# Patient Record
Sex: FEMALE
[De-identification: names, ages, dates, MRNs, and addresses within clinical notes are randomized per-mention and may not be internally consistent; named-entity substitution may affect disease eponyms.]

---

## 2017-01-12 ENCOUNTER — RESULT REVIEW (OUTPATIENT)
Age: 37
End: 2017-01-12

## 2017-01-12 DIAGNOSIS — R93.8 ABNORMAL FINDINGS ON DIAGNOSTIC IMAGING OF OTHER SPECIFIED BODY STRUCTURES: ICD-10-CM

## 2017-04-03 ENCOUNTER — OTHER (OUTPATIENT)
Age: 37
End: 2017-04-03

## 2017-05-09 ENCOUNTER — APPOINTMENT (OUTPATIENT)
Dept: OBGYN | Facility: CLINIC | Age: 37
End: 2017-05-09

## 2017-05-09 VITALS
BODY MASS INDEX: 22.08 KG/M2 | WEIGHT: 120 LBS | HEIGHT: 62 IN | SYSTOLIC BLOOD PRESSURE: 104 MMHG | DIASTOLIC BLOOD PRESSURE: 68 MMHG

## 2017-05-10 LAB
BILIRUB UR QL STRIP: NORMAL
COLLECTION METHOD: NORMAL
GLUCOSE UR-MCNC: NORMAL
HCG UR QL: 0.2 EU/DL
HGB UR QL STRIP.AUTO: NORMAL
KETONES UR-MCNC: NORMAL
LEUKOCYTE ESTERASE UR QL STRIP: NORMAL
NITRITE UR QL STRIP: NORMAL
PH UR STRIP: 6
PROT UR STRIP-MCNC: NORMAL
SP GR UR STRIP: <=1.005

## 2017-05-11 DIAGNOSIS — Q51.9 CONGENITAL MALFORMATION OF UTERUS AND CERVIX, UNSPECIFIED: ICD-10-CM

## 2017-05-12 ENCOUNTER — RESULT REVIEW (OUTPATIENT)
Age: 37
End: 2017-05-12

## 2017-08-03 ENCOUNTER — APPOINTMENT (OUTPATIENT)
Dept: HUMAN REPRODUCTION | Facility: CLINIC | Age: 37
End: 2017-08-03
Payer: COMMERCIAL

## 2017-08-03 PROCEDURE — 99243 OFF/OP CNSLTJ NEW/EST LOW 30: CPT

## 2017-09-08 ENCOUNTER — APPOINTMENT (OUTPATIENT)
Dept: HUMAN REPRODUCTION | Facility: CLINIC | Age: 37
End: 2017-09-08
Payer: COMMERCIAL

## 2017-09-08 PROCEDURE — 76830 TRANSVAGINAL US NON-OB: CPT

## 2017-09-08 PROCEDURE — 36415 COLL VENOUS BLD VENIPUNCTURE: CPT

## 2017-09-08 PROCEDURE — 99213 OFFICE O/P EST LOW 20 MIN: CPT | Mod: 25

## 2017-09-15 ENCOUNTER — APPOINTMENT (OUTPATIENT)
Dept: HUMAN REPRODUCTION | Facility: CLINIC | Age: 37
End: 2017-09-15
Payer: COMMERCIAL

## 2017-09-15 DIAGNOSIS — E22.1 HYPERPROLACTINEMIA: ICD-10-CM

## 2017-09-15 PROCEDURE — 76831 ECHO EXAM UTERUS: CPT

## 2017-09-15 PROCEDURE — 99213 OFFICE O/P EST LOW 20 MIN: CPT | Mod: 25

## 2017-09-15 PROCEDURE — 58340 CATHETER FOR HYSTEROGRAPHY: CPT

## 2017-09-16 ENCOUNTER — APPOINTMENT (OUTPATIENT)
Dept: HUMAN REPRODUCTION | Facility: CLINIC | Age: 37
End: 2017-09-16

## 2017-09-16 PROBLEM — E22.1 HYPERPROLACTINEMIA: Status: ACTIVE | Noted: 2017-09-16

## 2017-09-16 RX ORDER — CABERGOLINE 0.5 MG/1
0.5 TABLET ORAL
Qty: 8 | Refills: 6 | Status: ACTIVE | COMMUNITY
Start: 2017-09-16 | End: 1900-01-01

## 2017-09-29 ENCOUNTER — APPOINTMENT (OUTPATIENT)
Dept: MRI IMAGING | Facility: CLINIC | Age: 37
End: 2017-09-29
Payer: COMMERCIAL

## 2017-09-29 ENCOUNTER — OUTPATIENT (OUTPATIENT)
Dept: OUTPATIENT SERVICES | Facility: HOSPITAL | Age: 37
LOS: 1 days | End: 2017-09-29
Payer: COMMERCIAL

## 2017-09-29 ENCOUNTER — APPOINTMENT (OUTPATIENT)
Dept: CT IMAGING | Facility: CLINIC | Age: 37
End: 2017-09-29
Payer: COMMERCIAL

## 2017-09-29 DIAGNOSIS — Z00.8 ENCOUNTER FOR OTHER GENERAL EXAMINATION: ICD-10-CM

## 2017-09-29 PROCEDURE — 72197 MRI PELVIS W/O & W/DYE: CPT | Mod: 26

## 2017-09-29 PROCEDURE — 72197 MRI PELVIS W/O & W/DYE: CPT

## 2017-09-29 PROCEDURE — 70460 CT HEAD/BRAIN W/DYE: CPT | Mod: 26

## 2017-09-29 PROCEDURE — A9585: CPT

## 2017-09-29 PROCEDURE — 70460 CT HEAD/BRAIN W/DYE: CPT

## 2017-10-05 ENCOUNTER — APPOINTMENT (OUTPATIENT)
Dept: HUMAN REPRODUCTION | Facility: CLINIC | Age: 37
End: 2017-10-05
Payer: COMMERCIAL

## 2017-10-05 PROCEDURE — 99213 OFFICE O/P EST LOW 20 MIN: CPT | Mod: 25

## 2017-10-05 PROCEDURE — 76830 TRANSVAGINAL US NON-OB: CPT

## 2017-10-05 PROCEDURE — 36415 COLL VENOUS BLD VENIPUNCTURE: CPT

## 2017-10-12 ENCOUNTER — APPOINTMENT (OUTPATIENT)
Dept: HUMAN REPRODUCTION | Facility: CLINIC | Age: 37
End: 2017-10-12
Payer: COMMERCIAL

## 2017-10-12 PROCEDURE — 58340 CATHETER FOR HYSTEROGRAPHY: CPT

## 2017-10-12 PROCEDURE — 76831 ECHO EXAM UTERUS: CPT

## 2017-10-12 PROCEDURE — 99213 OFFICE O/P EST LOW 20 MIN: CPT | Mod: 25

## 2017-10-13 ENCOUNTER — APPOINTMENT (OUTPATIENT)
Dept: HUMAN REPRODUCTION | Facility: CLINIC | Age: 37
End: 2017-10-13

## 2018-02-14 ENCOUNTER — TRANSCRIPTION ENCOUNTER (OUTPATIENT)
Age: 38
End: 2018-02-14

## 2019-04-29 ENCOUNTER — APPOINTMENT (OUTPATIENT)
Dept: OBGYN | Facility: CLINIC | Age: 39
End: 2019-04-29
Payer: COMMERCIAL

## 2019-04-29 VITALS
SYSTOLIC BLOOD PRESSURE: 122 MMHG | DIASTOLIC BLOOD PRESSURE: 74 MMHG | WEIGHT: 120 LBS | BODY MASS INDEX: 21.26 KG/M2 | HEIGHT: 63 IN

## 2019-04-29 LAB
BILIRUB UR QL STRIP: NORMAL
COLLECTION METHOD: NORMAL
GLUCOSE UR-MCNC: NORMAL
HCG UR QL: 0.2 EU/DL
HGB UR QL STRIP.AUTO: NORMAL
KETONES UR-MCNC: NORMAL
LEUKOCYTE ESTERASE UR QL STRIP: NORMAL
NITRITE UR QL STRIP: NORMAL
PH UR STRIP: 7
PROT UR STRIP-MCNC: NORMAL
SP GR UR STRIP: <=1.005

## 2019-04-29 PROCEDURE — 99214 OFFICE O/P EST MOD 30 MIN: CPT

## 2019-04-29 PROCEDURE — 81003 URINALYSIS AUTO W/O SCOPE: CPT | Mod: NC,QW

## 2019-04-29 RX ORDER — VALACYCLOVIR 1 G/1
1 TABLET, FILM COATED ORAL
Qty: 16 | Refills: 0 | Status: DISCONTINUED | COMMUNITY
Start: 2019-03-04

## 2019-04-29 RX ORDER — VALACYCLOVIR 500 MG/1
500 TABLET, FILM COATED ORAL TWICE DAILY
Qty: 28 | Refills: 3 | Status: ACTIVE | COMMUNITY
Start: 2019-04-29 | End: 1900-01-01

## 2019-04-29 RX ORDER — FLUOCINONIDE 0.5 MG/G
0.05 CREAM TOPICAL
Qty: 45 | Refills: 0 | Status: DISCONTINUED | COMMUNITY
Start: 2019-03-22

## 2019-04-29 NOTE — PHYSICAL EXAM
[Normal] : urethra [de-identified] : 2 SMALL, SYMMETRIC, 3 MM, RAISED LESIONS AT LEFT LABUM MAJOR, 4 AND 5 OCLOCK. SUPERIOR LESION WITH EARLY  VESICLE FORMATION [FreeTextEntry4] : DEFERRED DUE TO VULVAR LESIONS

## 2019-04-29 NOTE — CHIEF COMPLAINT
[Urgent Visit] : Urgent Visit [FreeTextEntry1] : PELVIC PAIN LAST 3 WEEK, INTERMITTENT, DIFFUSE LOWER.  SAW PRIMARY CARE LAST WEEK WITHOUT DIAGNOSIS.  NO FEVERS OR GENERAL SYSTEMIC DIARRHEA.\par \par NOW C/O LEFT VULVAR LESIONS X 2.\par \par  PRESENT THROUGHOUT ENTIRE VISIT.\par \par COUPLE BEING SEEN AT DR. JIANG FOR INFERTILITY.  WITH SECOND VARICOCELE PROCEDURE BEING SCHEDULED AT DR. LEO.\par \par  HERE WITH PATIENT.

## 2019-05-01 LAB
APPEARANCE: CLEAR
BACTERIA: NEGATIVE
BILIRUBIN URINE: NEGATIVE
BLOOD URINE: NEGATIVE
COLOR: NORMAL
GLUCOSE QUALITATIVE U: NEGATIVE
HSV+VZV DNA SPEC QL NAA+PROBE: NOT DETECTED
HYALINE CASTS: 0 /LPF
KETONES URINE: NEGATIVE
LEUKOCYTE ESTERASE URINE: NEGATIVE
MICROSCOPIC-UA: NORMAL
NITRITE URINE: NEGATIVE
PH URINE: 6.5
PROTEIN URINE: NEGATIVE
RED BLOOD CELLS URINE: 2 /HPF
SPECIFIC GRAVITY URINE: 1.01
SPECIMEN SOURCE: NORMAL
SQUAMOUS EPITHELIAL CELLS: 0 /HPF
UROBILINOGEN URINE: NORMAL
WHITE BLOOD CELLS URINE: 0 /HPF

## 2019-05-06 LAB — BACTERIA UR CULT: NORMAL

## 2019-05-07 ENCOUNTER — APPOINTMENT (OUTPATIENT)
Dept: OBGYN | Facility: CLINIC | Age: 39
End: 2019-05-07
Payer: COMMERCIAL

## 2019-05-07 VITALS
DIASTOLIC BLOOD PRESSURE: 60 MMHG | BODY MASS INDEX: 21.44 KG/M2 | WEIGHT: 121 LBS | HEIGHT: 63 IN | SYSTOLIC BLOOD PRESSURE: 110 MMHG

## 2019-05-07 DIAGNOSIS — N90.7 VULVAR CYST: ICD-10-CM

## 2019-05-07 PROCEDURE — 99395 PREV VISIT EST AGE 18-39: CPT

## 2019-05-07 NOTE — PHYSICAL EXAM
[Awake] : awake [Alert] : alert [Acute Distress] : no acute distress [Mass] : no breast mass [Nipple Discharge] : no nipple discharge [Axillary LAD] : no axillary lymphadenopathy [Soft] : soft [Tender] : non tender [Oriented x3] : oriented to person, place, and time [Normal] : uterus [No Bleeding] : there was no active vaginal bleeding [Uterine Adnexae] : were not tender and not enlarged [de-identified] : 2 X 2 MM CHRISTOS-RECTAL RAISED, NON-ERYTHEMATOUS LESIONS C/W SEBACEOUS CYSTS [Exam Deferred] : was deferred

## 2019-05-07 NOTE — HISTORY OF PRESENT ILLNESS
[___ Year(s) Ago] : [unfilled] year(s) ago [Definite:  ___ (Date)] : the last menstrual period was [unfilled] [Currently In Menopause] : not currently in menopause [Sexually Active] : is sexually active [Monogamous] : is monogamous [Male ___] : [unfilled] male

## 2019-05-09 LAB
CYTOLOGY CVX/VAG DOC THIN PREP: NORMAL
HPV HIGH+LOW RISK DNA PNL CVX: NOT DETECTED

## 2019-05-29 DIAGNOSIS — Z11.3 ENCOUNTER FOR SCREENING FOR INFECTIONS WITH A PREDOMINANTLY SEXUAL MODE OF TRANSMISSION: ICD-10-CM

## 2019-07-01 LAB
25(OH)D3 SERPL-MCNC: 30.5 NG/ML
B19V IGG SER QL IA: 6.5 INDEX
B19V IGG+IGM SER-IMP: NORMAL
B19V IGG+IGM SER-IMP: POSITIVE
B19V IGM FLD-ACNC: 0.2
B19V IGM SER-ACNC: NEGATIVE
BASOPHILS # BLD AUTO: 0.06 K/UL
BASOPHILS NFR BLD AUTO: 0.6 %
CMV IGG SERPL QL: 2.6 U/ML
CMV IGG SERPL-IMP: POSITIVE
EOSINOPHIL # BLD AUTO: 0.27 K/UL
EOSINOPHIL NFR BLD AUTO: 2.9 %
ESTRADIOL SERPL-MCNC: 24 PG/ML
FSH SERPL-MCNC: 7.9 IU/L
HBV SURFACE AB SER QL: NONREACTIVE
HCG SERPL-MCNC: <1 MIU/ML
HCT VFR BLD CALC: 39.9 %
HCV AB SER QL: NONREACTIVE
HCV S/CO RATIO: 0.08 S/CO
HGB BLD-MCNC: 12.7 G/DL
HIV1+2 AB SPEC QL IA.RAPID: NONREACTIVE
IMM GRANULOCYTES NFR BLD AUTO: 0.2 %
LH SERPL-ACNC: 7.9 IU/L
LYMPHOCYTES # BLD AUTO: 1.73 K/UL
LYMPHOCYTES NFR BLD AUTO: 18.4 %
MAN DIFF?: NORMAL
MCHC RBC-ENTMCNC: 28.4 PG
MCHC RBC-ENTMCNC: 31.8 GM/DL
MCV RBC AUTO: 89.3 FL
MONOCYTES # BLD AUTO: 0.54 K/UL
MONOCYTES NFR BLD AUTO: 5.8 %
NEUTROPHILS # BLD AUTO: 6.77 K/UL
NEUTROPHILS NFR BLD AUTO: 72.1 %
PLATELET # BLD AUTO: 417 K/UL
PROGEST SERPL-MCNC: 0.3 NG/ML
PROLACTIN SERPL-MCNC: 21.7 NG/ML
RBC # BLD: 4.47 M/UL
RBC # FLD: 15.4 %
T PALLIDUM AB SER DONR QL IF: NONREACTIVE
TSH SERPL-ACNC: 2.12 UIU/ML
WBC # FLD AUTO: 9.39 K/UL

## 2019-07-10 LAB
ANTI-MUELLERIAN HORMONE: 0.89 NG/ML
RPR SER-TITR: NORMAL
T PALLIDUM AB SER QL IA: NEGATIVE

## 2021-07-13 ENCOUNTER — TRANSCRIPTION ENCOUNTER (OUTPATIENT)
Age: 41
End: 2021-07-13

## 2021-09-01 ENCOUNTER — NON-APPOINTMENT (OUTPATIENT)
Age: 41
End: 2021-09-01

## 2021-09-01 ENCOUNTER — APPOINTMENT (OUTPATIENT)
Dept: OBGYN | Facility: CLINIC | Age: 41
End: 2021-09-01
Payer: COMMERCIAL

## 2021-09-01 VITALS — SYSTOLIC BLOOD PRESSURE: 128 MMHG | DIASTOLIC BLOOD PRESSURE: 62 MMHG | HEIGHT: 63 IN

## 2021-09-01 DIAGNOSIS — Z12.39 ENCOUNTER FOR OTHER SCREENING FOR MALIGNANT NEOPLASM OF BREAST: ICD-10-CM

## 2021-09-01 DIAGNOSIS — Z97.5 PRESENCE OF (INTRAUTERINE) CONTRACEPTIVE DEVICE: ICD-10-CM

## 2021-09-01 DIAGNOSIS — Z87.42 PERSONAL HISTORY OF OTHER DISEASES OF THE FEMALE GENITAL TRACT: ICD-10-CM

## 2021-09-01 DIAGNOSIS — Z12.4 ENCOUNTER FOR SCREENING FOR MALIGNANT NEOPLASM OF CERVIX: ICD-10-CM

## 2021-09-01 DIAGNOSIS — Z01.419 ENCOUNTER FOR GYNECOLOGICAL EXAMINATION (GENERAL) (ROUTINE) W/OUT ABNORMAL FINDINGS: ICD-10-CM

## 2021-09-01 DIAGNOSIS — Z31.69 ENCOUNTER FOR OTHER GENERAL COUNSELING AND ADVICE ON PROCREATION: ICD-10-CM

## 2021-09-01 DIAGNOSIS — Z86.39 PERSONAL HISTORY OF OTHER ENDOCRINE, NUTRITIONAL AND METABOLIC DISEASE: ICD-10-CM

## 2021-09-01 DIAGNOSIS — R82.90 UNSPECIFIED ABNORMAL FINDINGS IN URINE: ICD-10-CM

## 2021-09-01 DIAGNOSIS — N93.9 ABNORMAL UTERINE AND VAGINAL BLEEDING, UNSPECIFIED: ICD-10-CM

## 2021-09-01 LAB
HCG UR QL: NEGATIVE
QUALITY CONTROL: YES

## 2021-09-01 PROCEDURE — 81025 URINE PREGNANCY TEST: CPT

## 2021-09-01 PROCEDURE — 99395 PREV VISIT EST AGE 18-39: CPT

## 2021-09-01 RX ORDER — SELENIUM SULFIDE 22.5 MG/ML
2.25 SHAMPOO TOPICAL
Refills: 0 | Status: ACTIVE | COMMUNITY

## 2021-09-01 RX ORDER — CHROMIUM 200 MCG
TABLET ORAL
Refills: 0 | Status: ACTIVE | COMMUNITY

## 2021-09-01 RX ORDER — VITAMIN E 268 MG
CAPSULE ORAL
Refills: 0 | Status: ACTIVE | COMMUNITY

## 2021-09-01 RX ORDER — LEVOTHYROXINE SODIUM 137 UG/1
TABLET ORAL
Refills: 0 | Status: ACTIVE | COMMUNITY

## 2021-09-01 RX ORDER — ASCORBIC ACID 500 MG
TABLET ORAL
Refills: 0 | Status: ACTIVE | COMMUNITY

## 2021-09-01 RX ORDER — OMEGA-3/DHA/EPA/FISH OIL 300-1000MG
CAPSULE ORAL
Refills: 0 | Status: ACTIVE | COMMUNITY

## 2021-09-01 NOTE — PHYSICAL EXAM
[Appropriately responsive] : appropriately responsive [Alert] : alert [No Acute Distress] : no acute distress [No Murmurs] : no murmurs [Soft] : soft [Non-tender] : non-tender [Non-distended] : non-distended [No HSM] : No HSM [No Lesions] : no lesions [No Mass] : no mass [Oriented x3] : oriented x3 [Examination Of The Breasts] : a normal appearance [No Masses] : no breast masses were palpable [Labia Majora] : normal [Labia Minora] : normal [Normal] : normal [Uterine Adnexae] : normal [Exam Deferred] : was deferred

## 2021-09-01 NOTE — HISTORY OF PRESENT ILLNESS
[FreeTextEntry1] : PATIENT HAD MYOMECTOMY AND SEPTOPLASTY DONE 1/2020 FOR PRIMARY INFERTILITY.  MENSES HAVE BEEN REGULAR, NO CONTRACEPTING.  C/O SOME DYSMENORRHEA, RLQ, PRIOR TO MENSES AS WELL.  RELIEVED WITH ALLEVE.  \par \par NEW INFERTILITY DR. AGUILA SALCIDO.\par \par BREAST SCREENING INTERVALS REVIEWED, BASED ON EVIDENCE BASED RECOMMENDATIONS FOR BREAST CANCER IN THE [place] FOR Patient's AGE, PERSONAL AND FAMILY CANCER HISTORIES.  DISCUSSED UTILITY OF BREAST MAMMOGRAM, SONOGRAM AND MRI.   RECOMMENDED APPROPRIATE BREAST TESTING.\par \par DISCUSSED PRECAUTIONS AGAINST COVID19, INCLUDING MASK WEARING, SOCIAL DISTANCING AND HAND WASHING.\par VACCINATED X 2. [Patient reported PAP Smear was normal] : Patient reported PAP Smear was normal [PapSmeardate] : 5/2019 [Currently Active] : currently active [Men] : men [Vaginal] : vaginal [No] : No [FreeTextEntry2] :

## 2021-09-01 NOTE — PLAN
[FreeTextEntry1] : [facility], SURGERY 1/2020.\par \par MAMMOGRAM PRIOR TO PREGNANCY, NEARLY 41 Y.O.

## 2021-09-03 LAB — HPV HIGH+LOW RISK DNA PNL CVX: NOT DETECTED

## 2021-09-08 LAB — CYTOLOGY CVX/VAG DOC THIN PREP: ABNORMAL
